# Patient Record
Sex: FEMALE | Race: WHITE
[De-identification: names, ages, dates, MRNs, and addresses within clinical notes are randomized per-mention and may not be internally consistent; named-entity substitution may affect disease eponyms.]

---

## 2017-03-23 ENCOUNTER — HOSPITAL ENCOUNTER (OUTPATIENT)
Dept: HOSPITAL 79 - GENOP | Age: 32
Discharge: HOME | End: 2017-03-23
Attending: OBSTETRICS & GYNECOLOGY
Payer: COMMERCIAL

## 2017-03-23 DIAGNOSIS — O99.89: Primary | ICD-10-CM

## 2017-03-23 DIAGNOSIS — R10.30: ICD-10-CM

## 2017-03-23 DIAGNOSIS — Z3A.26: ICD-10-CM

## 2017-03-23 PROCEDURE — G0463 HOSPITAL OUTPT CLINIC VISIT: HCPCS

## 2017-05-12 ENCOUNTER — HOSPITAL ENCOUNTER (OUTPATIENT)
Dept: HOSPITAL 79 - GENOP | Age: 32
Discharge: HOME | End: 2017-05-12
Attending: OBSTETRICS & GYNECOLOGY
Payer: COMMERCIAL

## 2017-05-12 DIAGNOSIS — Z3A.33: ICD-10-CM

## 2017-05-12 DIAGNOSIS — O99.89: Primary | ICD-10-CM

## 2017-06-08 ENCOUNTER — HOSPITAL ENCOUNTER (OUTPATIENT)
Dept: HOSPITAL 79 - GENOP | Age: 32
Discharge: HOME | End: 2017-06-08
Attending: OBSTETRICS & GYNECOLOGY
Payer: COMMERCIAL

## 2017-06-08 DIAGNOSIS — R10.9: ICD-10-CM

## 2017-06-08 DIAGNOSIS — O99.89: Primary | ICD-10-CM

## 2017-06-08 DIAGNOSIS — Z3A.37: ICD-10-CM

## 2017-06-08 PROCEDURE — G0463 HOSPITAL OUTPT CLINIC VISIT: HCPCS

## 2017-06-16 ENCOUNTER — HOSPITAL ENCOUNTER (OUTPATIENT)
Dept: HOSPITAL 79 - GENOP | Age: 32
Discharge: HOME | End: 2017-06-16
Attending: OBSTETRICS & GYNECOLOGY
Payer: COMMERCIAL

## 2017-06-16 DIAGNOSIS — O99.89: Primary | ICD-10-CM

## 2017-06-16 DIAGNOSIS — Z3A.38: ICD-10-CM

## 2017-06-16 DIAGNOSIS — R10.9: ICD-10-CM

## 2017-06-16 PROCEDURE — G0463 HOSPITAL OUTPT CLINIC VISIT: HCPCS

## 2017-06-23 ENCOUNTER — HOSPITAL ENCOUNTER (INPATIENT)
Dept: HOSPITAL 79 - OB | Age: 32
LOS: 2 days | Discharge: HOME | End: 2017-06-25
Attending: OBSTETRICS & GYNECOLOGY | Admitting: OBSTETRICS & GYNECOLOGY
Payer: COMMERCIAL

## 2017-06-23 VITALS — HEIGHT: 71 IN | WEIGHT: 293 LBS | BODY MASS INDEX: 41.02 KG/M2

## 2017-06-23 DIAGNOSIS — M41.9: ICD-10-CM

## 2017-06-23 DIAGNOSIS — O36.63X0: ICD-10-CM

## 2017-06-23 DIAGNOSIS — Z82.5: ICD-10-CM

## 2017-06-23 DIAGNOSIS — O99.89: ICD-10-CM

## 2017-06-23 DIAGNOSIS — O40.3XX0: Primary | ICD-10-CM

## 2017-06-23 DIAGNOSIS — Z87.440: ICD-10-CM

## 2017-06-23 DIAGNOSIS — E66.01: ICD-10-CM

## 2017-06-23 DIAGNOSIS — O09.293: ICD-10-CM

## 2017-06-23 DIAGNOSIS — Z80.3: ICD-10-CM

## 2017-06-23 DIAGNOSIS — Z80.9: ICD-10-CM

## 2017-06-23 DIAGNOSIS — Z83.3: ICD-10-CM

## 2017-06-23 DIAGNOSIS — Z3A.39: ICD-10-CM

## 2017-06-23 DIAGNOSIS — Z82.49: ICD-10-CM

## 2017-06-23 PROCEDURE — C9113 INJ PANTOPRAZOLE SODIUM, VIA: HCPCS

## 2017-06-23 PROCEDURE — 3E0R3CZ: ICD-10-PCS

## 2017-06-24 LAB — HGB BLD-MCNC: 10.5 GM/DL (ref 12.3–15.3)

## 2022-05-14 ENCOUNTER — HOSPITAL ENCOUNTER (INPATIENT)
Dept: HOSPITAL 79 - ER1 | Age: 37
LOS: 2 days | Discharge: HOME | DRG: 917 | End: 2022-05-16
Attending: INTERNAL MEDICINE | Admitting: STUDENT IN AN ORGANIZED HEALTH CARE EDUCATION/TRAINING PROGRAM
Payer: COMMERCIAL

## 2022-05-14 VITALS — HEIGHT: 72 IN | WEIGHT: 293 LBS | BODY MASS INDEX: 39.68 KG/M2

## 2022-05-14 DIAGNOSIS — Z98.891: ICD-10-CM

## 2022-05-14 DIAGNOSIS — T39.1X1A: Primary | ICD-10-CM

## 2022-05-14 DIAGNOSIS — I45.81: ICD-10-CM

## 2022-05-14 DIAGNOSIS — E66.9: ICD-10-CM

## 2022-05-14 DIAGNOSIS — R44.1: ICD-10-CM

## 2022-05-14 DIAGNOSIS — G92.8: ICD-10-CM

## 2022-05-14 DIAGNOSIS — Z20.822: ICD-10-CM

## 2022-05-14 LAB
BUN/CREATININE RATIO: 15 (ref 0–10)
HGB BLD-MCNC: 13.3 GM/DL (ref 12.3–15.3)
RED BLOOD COUNT: 4.66 M/UL (ref 4–5.1)
WHITE BLOOD COUNT: 6.2 K/UL (ref 4.5–11)

## 2022-05-14 PROCEDURE — G0480 DRUG TEST DEF 1-7 CLASSES: HCPCS

## 2022-05-14 PROCEDURE — U0002 COVID-19 LAB TEST NON-CDC: HCPCS

## 2022-05-15 LAB
BUN/CREATININE RATIO: 12 (ref 0–10)
BUN/CREATININE RATIO: 8 (ref 0–10)
HGB BLD-MCNC: 14 GM/DL (ref 12.3–15.3)
RED BLOOD COUNT: 4.88 M/UL (ref 4–5.1)
WHITE BLOOD COUNT: 7 K/UL (ref 4.5–11)

## 2022-05-16 NOTE — NUR
PATIENT EDUCATED ON DISCHARGE INFORMATION AND AGREED THAT SHE UNDERSTANDS THE
INFORMATION GIVEN TO THE PATIENT. A PRINTED VERSION OF DISCHARGE INFROMATION
WAS GIVEN TO THE PATIENT AS WELL. BOTH PATIENT IV'S WERE REMOVED. PATIENT IS
STABLE AT TIME OF DISCHARGE AND IS TRANSPORTED VIA WHEELCHAIR.